# Patient Record
Sex: FEMALE | Race: WHITE
[De-identification: names, ages, dates, MRNs, and addresses within clinical notes are randomized per-mention and may not be internally consistent; named-entity substitution may affect disease eponyms.]

---

## 2018-10-25 ENCOUNTER — HOSPITAL ENCOUNTER (OUTPATIENT)
Dept: HOSPITAL 88 - OR | Age: 58
Discharge: HOME | End: 2018-10-25
Attending: INTERNAL MEDICINE
Payer: COMMERCIAL

## 2018-10-25 VITALS — DIASTOLIC BLOOD PRESSURE: 64 MMHG | SYSTOLIC BLOOD PRESSURE: 110 MMHG

## 2018-10-25 DIAGNOSIS — Z09: Primary | ICD-10-CM

## 2018-10-25 DIAGNOSIS — Z79.84: ICD-10-CM

## 2018-10-25 DIAGNOSIS — M06.9: ICD-10-CM

## 2018-10-25 DIAGNOSIS — K21.9: ICD-10-CM

## 2018-10-25 DIAGNOSIS — E03.9: ICD-10-CM

## 2018-10-25 DIAGNOSIS — I10: ICD-10-CM

## 2018-10-25 DIAGNOSIS — E66.01: ICD-10-CM

## 2018-10-25 DIAGNOSIS — K62.89: ICD-10-CM

## 2018-10-25 DIAGNOSIS — F17.210: ICD-10-CM

## 2018-10-25 DIAGNOSIS — E11.9: ICD-10-CM

## 2018-10-25 DIAGNOSIS — Z88.0: ICD-10-CM

## 2018-10-25 DIAGNOSIS — G47.33: ICD-10-CM

## 2018-10-25 DIAGNOSIS — Z80.0: ICD-10-CM

## 2018-10-25 DIAGNOSIS — Z86.010: ICD-10-CM

## 2018-10-25 DIAGNOSIS — Z79.82: ICD-10-CM

## 2018-10-25 PROCEDURE — 36415 COLL VENOUS BLD VENIPUNCTURE: CPT

## 2018-10-25 PROCEDURE — 45378 DIAGNOSTIC COLONOSCOPY: CPT

## 2018-10-25 PROCEDURE — 82948 REAGENT STRIP/BLOOD GLUCOSE: CPT

## 2018-10-25 NOTE — OPERATIVE REPORT
DATE OF PROCEDURE:  October 25, 2018 

 

PROCEDURE PERFORMED:  Colonoscopy.



PREOPERATIVE DIAGNOSIS:  History of colon polyps.



POSTOPERATIVE DIAGNOSIS:  History of colon polyps.



PREOPERATIVE MEDICATIONS:  Consisted of general anesthesia.



Using the [employer] video colonoscope, it was inserted into the 

patient's rectum and advanced without difficulty to the level of the cecum. 

 The colonoscope was taken back down to the rectum.  No new colon polyps 

were found.  Down in the anal canal, there was evidence of some anal 

cryptitis.  The colonoscope was withdrawn from the patient's rectum, and 

the procedure was ended.  









DD:  10/25/2018 09:21

DT:  10/25/2018 10:41

Job#:  [id number]